# Patient Record
Sex: FEMALE
[De-identification: names, ages, dates, MRNs, and addresses within clinical notes are randomized per-mention and may not be internally consistent; named-entity substitution may affect disease eponyms.]

---

## 2018-01-06 ENCOUNTER — HOSPITAL ENCOUNTER (EMERGENCY)
Dept: HOSPITAL 88 - ER | Age: 63
Discharge: HOME | End: 2018-01-06
Payer: COMMERCIAL

## 2018-01-06 VITALS — DIASTOLIC BLOOD PRESSURE: 86 MMHG | SYSTOLIC BLOOD PRESSURE: 144 MMHG

## 2018-01-06 VITALS — WEIGHT: 146 LBS | BODY MASS INDEX: 27.56 KG/M2 | HEIGHT: 61 IN

## 2018-01-06 DIAGNOSIS — R11.2: ICD-10-CM

## 2018-01-06 DIAGNOSIS — E11.9: ICD-10-CM

## 2018-01-06 DIAGNOSIS — J20.9: ICD-10-CM

## 2018-01-06 DIAGNOSIS — R05: ICD-10-CM

## 2018-01-06 DIAGNOSIS — R50.9: Primary | ICD-10-CM

## 2018-01-06 DIAGNOSIS — I10: ICD-10-CM

## 2018-01-06 DIAGNOSIS — E78.5: ICD-10-CM

## 2018-01-06 PROCEDURE — 87400 INFLUENZA A/B EACH AG IA: CPT

## 2018-01-06 PROCEDURE — 99283 EMERGENCY DEPT VISIT LOW MDM: CPT

## 2018-01-06 PROCEDURE — 71020: CPT

## 2018-01-06 NOTE — DIAGNOSTIC IMAGING REPORT
EXAMINATION: Chest,  CHEST 2 VIEWS    



INDICATION: Chest pain



COMPARISON:  None

     

FINDINGS:    



LINES:  None.



Heart:  Normal cardiac silhouette.



Vascular: The pulmonary vasculature is within normal limits.  Atherosclerotic

calcifications of the aortic arch.



Mediastinum: No mediastinal, hilar, or axillary mass or lymphadenopathy.



Lungs: No parenchymal mass.  No focal consolidation.



Pleura:  No pleural effusion.  No pneumothorax.



Bones: No acute osseous abnormality.  Degenerative changes of the thoracic

spine.



Soft tissues:  Normal.



Impression: 

No acute radiographic abnormality.



Signed by: Dr. Gómez Magaña M.D. on 1/6/2018 12:12 PM

## 2019-10-22 ENCOUNTER — HOSPITAL ENCOUNTER (EMERGENCY)
Dept: HOSPITAL 88 - ER | Age: 64
Discharge: HOME | End: 2019-10-22
Payer: MEDICARE

## 2019-10-22 VITALS — WEIGHT: 146 LBS | HEIGHT: 61 IN | BODY MASS INDEX: 27.56 KG/M2

## 2019-10-22 DIAGNOSIS — E11.9: ICD-10-CM

## 2019-10-22 DIAGNOSIS — E78.00: ICD-10-CM

## 2019-10-22 DIAGNOSIS — I10: ICD-10-CM

## 2019-10-22 DIAGNOSIS — L24.9: Primary | ICD-10-CM

## 2019-10-22 PROCEDURE — 99282 EMERGENCY DEPT VISIT SF MDM: CPT

## 2019-10-22 NOTE — XMS REPORT
Summary of Care

                             Created on: 2019



CINDY MERA

External Reference #: [id number]

: 1955

Sex: Female



Demographics







                          Address                   [id number] [place] [address] [address] 

[place]  [address]

 

                          Preferred Language        English

 

                          Marital Status            Unknown

 

                          Jew Affiliation     Unknown

 

                          Race                      White

 

                          Ethnic Group              Non-





Author







                          Author                    XAVIER TRAMMELL,  JESSICA

 

                          Organization              Unknown

 

                          Address                   Unknown

 

                          Phone                     Unavailable







Care Team Providers







                    Care Team Member Name    Role                Phone

 

                    XAVIER TRAMMELL,  JESSICA    Unavailable         Unavailable

 

                    XAVIER KIMBROUGH,  JESSICA BOBY    Unavailable         Unavailable

 

                          Unavailable               Unavailable







Functional Status







                    Name                Dates               Details

 

                                        Functional status health issues are not documented

                                                    Status: 









                    Name                Dates               Details

 

                                        Cognitive status health issues are not documented

                                                    Status: 







Problems







                    Name                Dates               Details

 

                                        Left elbow pain (719.42, M25.522) 

                                                    Status: Active

 

                                        Sprain of carpal joint of left wrist, initial encounter (842.01, S63.512A) 

                                                    Status: Active

 

                                        Pain of both elbows (719.42, M25.521) 

                                                    Status: Active

 

                                        Left wrist pain (719.43, M25.532) 

                                                    Status: Active

 

                                        Closed nondisplaced fracture of head of left radius, initial encounter (813.05, 

S52.125A) 

                                                    Status: Active

 

                                        Closed nondisplaced fracture of head of left radius with routine healing, subsequent

 encounter (V54.12, S52.125D) 

                                                    Status: Active

 

                                        Sprain of carpal joint, left, subsequent encounter (V58.89, S63.512D) 

                                                    Status: Active







Medications







                    Name                Dates               Details

 

                                        amLODIPine Besylate 10 MG Oral Tablet



 









Active

Atorvastatin Calcium 40 MG Oral Tablet

* 





                                         Refills: 0







Active

Lisinopril-HCTZ 25-20 MG TABS

* 





                                         Refills: 0







Active

tiZANidine HCl - 4 MG Oral Tablet

* 





                                         Refills: 0







Active

Topiramate ER 25 MG Oral Capsule ER 24 Hour Sprinkle

* 





                                         Refills: 0







Active

Vitamin D3 2000 UNIT Oral Tablet

* 





                                         Refills: 0







Active

HumaLOG KwikPen 100 UNIT/ML Subcutaneous Solution Pen-injector

* 





                                         Refills: 0







Active

Basaglar KwikPen SOPN

* 





                                         Refills: 0







Active





Allergies and Adverse Reactions







                    Name                Dates               Details

 

                    Penicillins (Allergy)                        Status: Active









Past Medical History







                    Name                Dates               Details

 

                                        History of Diabetes (250.00, E11.9) 

                                                    Status: Resolved

 

                                        History of High blood pressure (401.9, I10) 

                                                    Status: Resolved







Procedures







                    Procedure           Dates               Details

 

                    History of Hysterectomy                        Completed 









Immunization







                    Name                Dates               Details

 

                                        Immunizations not documented

                                                     







Social History







                    Name                Dates               Details

 

                                        -

                                                    Status: 









                    Name                Dates               Details

 

                    Never smoker                             







Vital Signs







                Date            Test            Result          Details

 

                                                                 

 

                                        [id number]

                    Height              61 in               Status: 



 

                    Weight              137 lb              Status: 



 

                    Body Mass Index Calculated    25.89 kg/m2         Status: 



 

                    Body Surface Area Calculated    1.61 m2             Status: 



 

                                                                 

 

                                        23-May-201910:06

                    BP Systolic         142 mm[Hg]          Status: 



 

                    BP Diastolic        75 mm[Hg]           Status: 



 

                    Height              61 in               Status: 



 

                    Weight              130 lb              Status: 



 

                    Body Mass Index Calculated    24.56 kg/m2         Status: 



 

                    Body Surface Area Calculated    1.57 m2             Status: 









Results







                Date            Description     Value           Details

 

                    Results not documented                         

 

                                                                 







Plan of Care







                    Name                Dates               Details

 

                                        Planned Observations 

 

                    Planned Goals not documented                         







Interventions Provided

Plan* Follow Up:  

*  Please schedule an appointment as needed for any future problems or concerns.
   

* She has reached maximum medical recovery for this injury. Her ROM is better 
  and her pain is also greatly improved. She feels ready to RTW FD. HEP again 
  encouraged. If she does have any future troubles related to the injury, we can
   certainly see her with authorization. Work status: FD going forward and she 
  is pleased with the plan.





Instructions







                    Name                Dates               Details

 

                                        Instructions not documented

                                                     







Encounters







                                        Appointment; JESSICA PRAK M.D. 

Encounter Diagnosis: Problem not documented

                                        On: 2019 9:00



 

                                        Appointment; JESSICA PARK M.D. 

Encounter Diagnosis: Problem not documented

                                        On: 2019 11:30



 

                                        Appointment; JESSICA PARK M.D. 

Encounter Diagnosis: Problem not documented

                                        On: 23-May-2019 10:00



 

                                        Appointment; JESSICA PARK M.D. 

Encounter Diagnosis: Problem not documented

                                        On: 2019 9:30

## 2019-10-22 NOTE — XMS REPORT
Patient Summary Document

                             Created on: 10/22/2019



CINDY MERA

External Reference #: [id number]

: 1955

Sex: Female



Demographics







                          Address                   [address] 

[place]  [address]

 

                          Home Phone                [phone]

 

                          Preferred Language        Unknown

 

                          Marital Status            Unknown

 

                          Temple Affiliation     Unknown

 

                          Race                      Unknown

 

                                        Additional Race(s) 

 

 

                          Ethnic Group              Unknown





Author







                          Author                    [facility]

 

                          [facility]

 

                          Address                   Unknown

 

                          Phone                     Unavailable







Care Team Providers







                    Care Team Member Name    Role                Phone

 

                    DENNYS TED KELLEY    Unavailable         Unavailable

 

                    LARA CARMICHAEL    Unavailable         Unavailable







Problems

This patient has no known problems.



Allergies, Adverse Reactions, Alerts

This patient has no known allergies or adverse reactions.



Medications

This patient has no known medications.



Encounters







             Start Date/Time    End Date/Time    Encounter Type    Admission Type    Attending Clinicians

                    Care Facility       Care Department     Encounter ID

 

        2019 10:30:00    2019 10:30:00    Emergency    E               [facility]    7502







Results







           Test Description    Test Time    Test Comments    Text Results    Atomic Results    Result

 Comments

 

                POCT-GLUCOSE METER    2019 08:39:00                      

 

   

 

                POC-GLUCOSE METER (BEAKER) (test code=1538)    229 mg/dL                 TESTED AT [address] [address]





POCT-GLUCOSE [id number] 08:39:00* 





                Test Item       Value           Reference Range    Comments

 

                POC-GLUCOSE METER (BEAKER) (test code=1538)    315 mg/dL                 Notified RN MD/TESTED

 AT [address] [address]





BASIC METABOLIC [id number] 07:55:00* 





                Test Item       Value           Reference Range    Comments

 

                SODIUM (BEAKER) (test code=381)    138 meq/L       136-145          

 

                POTASSIUM (BEAKER) (test code=379)    4.6 meq/L       3.5-5.1          

 

                CHLORIDE (BEAKER) (test code=382)    109 meq/L                  

 

                CO2 (BEAKER) (test code=355)    23 meq/L        22-29            

 

                BLOOD UREA NITROGEN (BEAKER) (test code=354)    28 mg/dL        7-21             

 

                CREATININE (BEAKER) (test code=358)    1.39 mg/dL      0.57-1.25        

 

                GLUCOSE RANDOM (BEAKER) (test code=652)    260 mg/dL                  

 

                CALCIUM (BEAKER) (test code=697)    8.4 mg/dL       8.4-10.2         

 

                EGFR (BEAKER) (test code=1092)     mL/min/1.73 sq m                    INSUFFICIENT CLINICAL DATA

 TO CALCULATE ESTIMATED GFR.





POCT-GLUCOSE [id number] 13:02:00* 





                Test Item       Value           Reference Range    Comments

 

                POC-GLUCOSE METER (BEAKER) (test code=1538)    336 mg/dL                 TESTED AT [facility] 

[address] [facility] [address]





POCT-GLUCOSE [id number] 13:02:00* 





                Test Item       Value           Reference Range    Comments

 

                POC-GLUCOSE METER (BEAKER) (test code=1538)    245 mg/dL                 TESTED AT [place] [id number] [facility] [address]





POCT-GLUCOSE [id number] 13:02:00* 





                Test Item       Value           Reference Range    Comments

 

                POC-GLUCOSE METER (BEAKER) (test code=1538)    241 mg/dL                 TESTED AT [place] [id number] [facility] [address]





BASIC METABOLIC [id number] 06:26:00* 





                Test Item       Value           Reference Range    Comments

 

                SODIUM (BEAKER) (test code=381)    134 meq/L       136-145          

 

                POTASSIUM (BEAKER) (test code=379)    4.4 meq/L       3.5-5.1          

 

                CHLORIDE (BEAKER) (test code=382)    106 meq/L                  

 

                CO2 (BEAKER) (test code=355)    23 meq/L        22-29            

 

                BLOOD UREA NITROGEN (BEAKER) (test code=354)    48 mg/dL        7-21             

 

                CREATININE (BEAKER) (test code=358)    1.83 mg/dL      0.57-1.25        

 

                GLUCOSE RANDOM (BEAKER) (test code=652)    269 mg/dL                  

 

                CALCIUM (BEAKER) (test code=697)    8.4 mg/dL       8.4-10.2         

 

                EGFR (BEAKER) (test code=1092)     mL/min/1.73 sq m                    INSUFFICIENT CLINICAL DATA

 TO CALCULATE ESTIMATED GFR.





CBC W/PLT COUNT & AUTO [id number] 05:47:00* 





                Test Item       Value           Reference Range    Comments

 

                WHITE BLOOD CELL COUNT (BEAKER) (test code=775)    10.0 K/ L       3.5-10.5         

 

                RED BLOOD CELL COUNT (BEAKER) (test code=761)    3.37 M/ L       3.93-5.22        

 

                HEMOGLOBIN (BEAKER) (test code=410)    9.9 GM/DL       11.2-15.7        

 

                HEMATOCRIT (BEAKER) (test code=411)    30.4 %          34.1-44.9        

 

                MEAN CORPUSCULAR VOLUME (BEAKER) (test code=753)    90.2 fL         79.4-94.8        

 

                MEAN CORPUSCULAR HEMOGLOBIN (BEAKER) (test code=751)    29.4 pg         25.6-32.2        

 

                    MEAN CORPUSCULAR HEMOGLOBIN CONC (BEAKER) (test code=752)    32.6 GM/DL          32.2-35.5

                                         

 

                RED CELL DISTRIBUTION WIDTH (BEAKER) (test code=412)    12.2 %          11.7-14.4        

 

                PLATELET COUNT (BEAKER) (test code=756)    215 K/CU MM     150-450          

 

                MEAN PLATELET VOLUME (BEAKER) (test code=754)    9.9 fL          9.4-12.3         

 

                NUCLEATED RED BLOOD CELLS (BEAKER) (test code=413)    0 /100 WBC      0-0              

 

                NEUTROPHILS RELATIVE PERCENT (BEAKER) (test code=429)    78 %                             

 

                LYMPHOCYTES RELATIVE PERCENT (BEAKER) (test code=430)    13 %                             

 

                MONOCYTES RELATIVE PERCENT (BEAKER) (test code=431)    8 %                              

 

                EOSINOPHILS RELATIVE PERCENT (BEAKER) (test code=432)    1 %                              

 

                BASOPHILS RELATIVE PERCENT (BEAKER) (test code=437)    0 %                              

 

                NEUTROPHILS ABSOLUTE COUNT (BEAKER) (test code=670)    7.80 K/ L       1.56-6.13        

 

                LYMPHOCYTES ABSOLUTE COUNT (BEAKER) (test code=414)    1.25 K/ L       1.18-3.74        

 

                MONOCYTES ABSOLUTE COUNT (BEAKER) (test code=415)    0.77 K/ L       0.24-0.36        

 

                EOSINOPHILS ABSOLUTE COUNT (BEAKER) (test code=416)    0.11 K/ L       0.04-0.36        

 

                BASOPHILS ABSOLUTE COUNT (BEAKER) (test code=417)    0.02 K/ L       0.01-0.08        

 

                IMMATURE GRANULOCYTES-RELATIVE PERCENT (BEAKER) (test code=2801)    1 %             0-1              





POCT-GLUCOSE [id number] 17:32:00* 





                Test Item       Value           Reference Range    Comments

 

                POC-GLUCOSE METER (BEAKER) (test code=1538)    249 mg/dL                 TESTED AT [facility] 

[address] [facility] [address]





POCT-GLUCOSE [id number] 12:36:00* 





                Test Item       Value           Reference Range    Comments

 

                POC-GLUCOSE METER (BEAKER) (test code=1538)    279 mg/dL                 TESTED AT [facility] 

[address] [facility] [address]





POCT-GLUCOSE [id number] 08:34:00* 





                Test Item       Value           Reference Range    Comments

 

                POC-GLUCOSE METER (BEAKER) (test code=1538)    124 mg/dL                 TESTED AT [place] [id number] [facility] [address]





BASIC METABOLIC [id number] 05:52:00* 





                Test Item       Value           Reference Range    Comments

 

                SODIUM (BEAKER) (test code=381)    133 meq/L       136-145          

 

                POTASSIUM (BEAKER) (test code=379)    3.7 meq/L       3.5-5.1          

 

                CHLORIDE (BEAKER) (test code=382)    103 meq/L                  

 

                CO2 (BEAKER) (test code=355)    22 meq/L        22-29            

 

                BLOOD UREA NITROGEN (BEAKER) (test code=354)    84 mg/dL        7-21             

 

                CREATININE (BEAKER) (test code=358)    3.51 mg/dL      0.57-1.25        

 

                GLUCOSE RANDOM (BEAKER) (test code=652)    99 mg/dL                   

 

                CALCIUM (BEAKER) (test code=697)    8.2 mg/dL       8.4-10.2         

 

                EGFR (BEAKER) (test code=1092)     mL/min/1.73 sq m                    INSUFFICIENT CLINICAL DATA

 TO CALCULATE ESTIMATED GFR.





CBC W/PLT COUNT & AUTO [id number] 05:05:00* 





                Test Item       Value           Reference Range    Comments

 

                WHITE BLOOD CELL COUNT (BEAKER) (test code=775)    7.7 K/ L        3.5-10.5         

 

                RED BLOOD CELL COUNT (BEAKER) (test code=761)    3.20 M/ L       3.93-5.22        

 

                HEMOGLOBIN (BEAKER) (test code=410)    9.7 GM/DL       11.2-15.7        

 

                HEMATOCRIT (BEAKER) (test code=411)    28.5 %          34.1-44.9        

 

                MEAN CORPUSCULAR VOLUME (BEAKER) (test code=753)    89.1 fL         79.4-94.8        

 

                MEAN CORPUSCULAR HEMOGLOBIN (BEAKER) (test code=751)    30.3 pg         25.6-32.2        

 

                    MEAN CORPUSCULAR HEMOGLOBIN CONC (BEAKER) (test code=752)    34.0 GM/DL          32.2-35.5

                                         

 

                RED CELL DISTRIBUTION WIDTH (BEAKER) (test code=412)    12.0 %          11.7-14.4        

 

                PLATELET COUNT (BEAKER) (test code=756)    204 K/CU MM     150-450          

 

                MEAN PLATELET VOLUME (BEAKER) (test code=754)    10.1 fL         9.4-12.3         

 

                NUCLEATED RED BLOOD CELLS (BEAKER) (test code=413)    0 /100 WBC      0-0              

 

                NEUTROPHILS RELATIVE PERCENT (BEAKER) (test code=429)    70 %                             

 

                LYMPHOCYTES RELATIVE PERCENT (BEAKER) (test code=430)    20 %                             

 

                MONOCYTES RELATIVE PERCENT (BEAKER) (test code=431)    8 %                              

 

                EOSINOPHILS RELATIVE PERCENT (BEAKER) (test code=432)    1 %                              

 

                BASOPHILS RELATIVE PERCENT (BEAKER) (test code=437)    0 %                              

 

                NEUTROPHILS ABSOLUTE COUNT (BEAKER) (test code=670)    5.38 K/ L       1.56-6.13        

 

                LYMPHOCYTES ABSOLUTE COUNT (BEAKER) (test code=414)    1.57 K/ L       1.18-3.74        

 

                MONOCYTES ABSOLUTE COUNT (BEAKER) (test code=415)    0.61 K/ L       0.24-0.36        

 

                EOSINOPHILS ABSOLUTE COUNT (BEAKER) (test code=416)    0.11 K/ L       0.04-0.36        

 

                BASOPHILS ABSOLUTE COUNT (BEAKER) (test code=417)    0.01 K/ L       0.01-0.08        

 

                IMMATURE GRANULOCYTES-RELATIVE PERCENT (BEAKER) (test code=2801)    0 %             0-1              





U/S, RENAL, [id number] 05:02:00Reason for exam:->akiFINAL REPORT PATIENT
ID:   [id number] Ultrasound of the Kidneys Clinical History:  odessa Discussion: 
Sonographic evaluation of the kidneys was performed. No prior study for direct 
comparison. Right kidney:  9.4 x 4.1 x 4.3 cm, with cortical thickness of 1.1 
cm.  Normal cortical echogenicity.  No mass.  No shadowing calculus. No 
hydronephrosis. Left kidney: 7.7 x 4.5 x 4.3 cm, with cortical thickness of 1.3 
cm.  Normal cortical echogenicity.  No mass.  No shadowing calculus.  No h
ydronephrosis. Limited doppler evaluation of bilateral main renal arteries and v
eins demonstrate patency.  Bladder:  Distended with a prevoid volume of 306 cc. 
Small postvoid residual of 44 cc. Impression:No hydronephrosis.Small urinary gabrielle
dder postvoid residual. Signed: Danna Corley MDReport Verified Date/Time:   05:02:14 Reading Location: [facility] B1 C013Y CT Body Reading Room    [facility] signed by: DANNA CORLEY MD on 2019 05:02 AM POCT-GLUCOSE 
[id number] 22:50:00* 





                Test Item       Value           Reference Range    Comments

 

                POC-GLUCOSE METER (BEAKER) (test code=1538)    95 mg/dL                  TESTED AT [facility] [address]

 [facility] [address]





POCT-GLUCOSE [id number] 22:12:00* 





                Test Item       Value           Reference Range    Comments

 

                POC-GLUCOSE METER (BEAKER) (test code=1538)    68 mg/dL                  TESTED AT [place] [id number]

 [facility] [address]





URINALYSIS W/ REFLEX URINE [id number] 21:48:00* 





                Test Item       Value           Reference Range    Comments

 

                COLOR (BEAKER) (test code=470)    Yellow                           

 

                CLARITY (BEAKER) (test code=469)    Hazy                             

 

                SPECIFIC GRAVITY UA (BEAKER) (test code=468)    1.012           1.001-1.035      

 

                PH UA (BEAKER) (test code=467)    5.0             5.0-8.0          

 

                PROTEIN UA (BEAKER) (test code=464)    20 mg/dL        Negative         

 

                GLUCOSE UA (BEAKER) (test code=365)    Negative        Negative         

 

                KETONES UA (BEAKER) (test code=371)    Trace           Negative         

 

                BILIRUBIN UA (BEAKER) (test code=462)    Negative        Negative         

 

                BLOOD UA (BEAKER) (test code=461)    Negative        Negative         

 

                NITRITE UA (BEAKER) (test code=465)    Negative        Negative         

 

                LEUKOCYTE ESTERASE UA (BEAKER) (test code=466)    Large           Negative         

 

                UROBILINOGEN UA (BEAKER) (test code=463)    0.2 mg/dL       0.2-1.0          

 

                RBC UA (BEAKER) (test code=519)    2 /HPF                           

 

                WBC UA (BEAKER) (test code=520)    18 /HPF                          

 

                BACTERIA (BEAKER) (test hhsd=031)    Many                             

 

                MUCUS (BEAKER) (test code=1574)    Rare                             

 

                SQUAMOUS EPITHELIAL (BEAKER) (test code=516)    1 /HPF                           

 

                HYALINE CASTS (BEAKER) (test code=514)    2 /LPF                           

 

                YEAST (BEAKER) (test code=1585)    Moderate                         

 

                SOURCE(BEAKER) (test code=2795)                                     





[id number] 18:13:00* 





                Test Item       Value           Reference Range    Comments

 

                POTASSIUM (BEAKER) (test code=379)    4.5 meq/L       3.5-5.1          





RAD, CHEST, 1 VIEW, NON [id number] 18:06:00Reason for exam:->coughShould 
this be performed at the bedside?->YesFINAL REPORT PATIENT ID:   [id number] 
Portable chest 2019, 1554 hours COMPARISON: None Lungs are clear and 
expanded. Heart is upper limits of normal in size. No mediastinal abnormalities 
are seen. No significant osseous abnormalities are noted although there are some
degenerative changes with hypertrophic changes extending inferiorly from the 
distal portion of the left clavicle. This, in part, may be related to old 
trauma. Signed: Chasidy Zhang Verified Date/Time:  2019 18:06:59 
Reading Location: [address] Consult Reading Room      Electronically signed 
by: CHASIDY ZHANG M.D. on 2019 06:06 PM POCT-GLUCOSE [id number] 
17:02:00* 





                Test Item       Value           Reference Range    Comments

 

                POC-GLUCOSE METER (BEAKER) (test code=1538)    270 mg/dL                 TESTED AT [facility] 

[address] [place] [address]





COMPREHENSIVE METABOLIC [id number] 15:46:00* 





                Test Item       Value           Reference Range    Comments

 

                TOTAL PROTEIN (BEAKER) (test code=770)    7.3 gm/dL       6.0-8.3         Specimen moderately 

hemolyzed

 

                ALBUMIN (BEAKER) (test code=1145)    3.8 g/dL        3.5-5.0         Specimen moderately hemolyzed



 

                ALKALINE PHOSPHATASE (BEAKER) (test code=346)    60 U/L                     

 

                BILIRUBIN TOTAL (BEAKER) (test code=377)    0.7 mg/dL       0.2-1.2         Specimen moderately

 hemolyzed

 

                SODIUM (BEAKER) (test code=381)    130 meq/L       136-145          

 

                POTASSIUM (BEAKER) (test code=379)    5.4 meq/L       3.5-5.1         Specimen moderately hemolyzed



 

                CHLORIDE (BEAKER) (test code=382)    93 meq/L                   

 

                CO2 (BEAKER) (test code=355)    24 meq/L        22-29            

 

                BLOOD UREA NITROGEN (BEAKER) (test code=354)    88 mg/dL        7-21             

 

                CREATININE (BEAKER) (test code=358)    4.85 mg/dL      0.57-1.25       Specimen moderately 

hemolyzed

 

                GLUCOSE RANDOM (BEAKER) (test code=652)    232 mg/dL                  

 

                CALCIUM (BEAKER) (test code=697)    9.2 mg/dL       8.4-10.2         

 

                AST (SGOT) (BEAKER) (test code=353)    17 U/L          5-34            Specimen moderately hemolyzed



 

                ALT (SGPT) (BEAKER) (test code=347)    12 U/L          6-55            Specimen moderately  hemolyzed



 

                EGFR (BEAKER) (test code=1092)     mL/min/1.73 sq m                    INSUFFICIENT CLINICAL DATA

 TO CALCULATE ESTIMATED GFR.





PROTHROMBIN [id number] 15:34:00* 





                Test Item       Value           Reference Range    Comments

 

                PROTIME (BEAKER) (test code=759)    14.1 seconds    11.7-14.7        

 

                INR (BEAKER) (test code=370)    1.1             <=5.9            





RECOMMENDED COUMADIN/WARFARIN INR THERAPY RANGESSTANDARD DOSE: 2.0 - 3.0   Inclu
janet: PROPHYLAXIS for venous thrombosis, systemic embolization; TREATMENT for kranthi
ous thrombosis and/or pulmonary embolus.HIGH RISK: Target INR is 2.5-3.5 for pat
ients with mechanical heart valves.CBC W/PLT COUNT & AUTO [id number]
15:17:00* 





                Test Item       Value           Reference Range    Comments

 

                WHITE BLOOD CELL COUNT (BEAKER) (test code=775)    7.0 K/ L        3.5-10.5         

 

                RED BLOOD CELL COUNT (BEAKER) (test code=761)    3.66 M/ L       3.93-5.22        

 

                HEMOGLOBIN (BEAKER) (test code=410)    10.8 GM/DL      11.2-15.7        

 

                HEMATOCRIT (BEAKER) (test code=411)    32.4 %          34.1-44.9        

 

                MEAN CORPUSCULAR VOLUME (BEAKER) (test code=753)    88.5 fL         79.4-94.8        

 

                MEAN CORPUSCULAR HEMOGLOBIN (BEAKER) (test code=751)    29.5 pg         25.6-32.2        

 

                    MEAN CORPUSCULAR HEMOGLOBIN CONC (BEAKER) (test code=752)    33.3 GM/DL          32.2-35.5

                                         

 

                RED CELL DISTRIBUTION WIDTH (BEAKER) (test code=412)    12.0 %          11.7-14.4        

 

                PLATELET COUNT (BEAKER) (test code=756)    257 K/CU MM     150-450          

 

                MEAN PLATELET VOLUME (BEAKER) (test code=754)    10.2 fL         9.4-12.3         

 

                NUCLEATED RED BLOOD CELLS (BEAKER) (test code=413)    0 /100 WBC      0-0              

 

                NEUTROPHILS RELATIVE PERCENT (BEAKER) (test code=429)    78 %                             

 

                LYMPHOCYTES RELATIVE PERCENT (BEAKER) (test code=430)    14 %                             

 

                MONOCYTES RELATIVE PERCENT (BEAKER) (test code=431)    6 %                              

 

                EOSINOPHILS RELATIVE PERCENT (BEAKER) (test code=432)    0 %                              

 

                BASOPHILS RELATIVE PERCENT (BEAKER) (test code=437)    0 %                              

 

                NEUTROPHILS ABSOLUTE COUNT (BEAKER) (test code=670)    5.51 K/ L       1.56-6.13        

 

                LYMPHOCYTES ABSOLUTE COUNT (BEAKER) (test code=414)    0.99 K/ L       1.18-3.74        

 

                MONOCYTES ABSOLUTE COUNT (BEAKER) (test code=415)    0.45 K/ L       0.24-0.36        

 

                EOSINOPHILS ABSOLUTE COUNT (BEAKER) (test code=416)    0.03 K/ L       0.04-0.36        

 

                BASOPHILS ABSOLUTE COUNT (BEAKER) (test code=417)    0.01 K/ L       0.01-0.08        

 

                IMMATURE GRANULOCYTES-RELATIVE PERCENT (BEAKER) (test code=2801)    1 %             0-1              





CHEST 2 VIEWS    [facility]   [address] [id number]      Patient Name: CINDY MERA   MR #:
[id number]    : 1955 Age/Sex: 62/F  Acct #: [id number] Req #: [id number]
[id number]  Adm Physician:     Ordered by: DARLING MARTIN  Report #: [id number]
[id number]   Location: ER  Room/Bed:     
___________________________________________________________________
________________________________    Procedure: [id number] DX/CHEST 2 VIEWS  Exam 
Date: 18                            Exam Time: 1150       REPORT STATUS: S
igned    EXAMINATION: Chest,  CHEST 2 VIEWS          INDICATION: Chest pain     
COMPARISON:  None           FINDINGS:          LINES:  None.      Heart:  Normal
cardiac silhouette.      Vascular: The pulmonary vasculature is within normal 
limits.  Atherosclerotic   calcifications of the aortic arch.      Mediastinum: 
No mediastinal, hilar, or axillary mass or lymphadenopathy.      Lungs: No paren
chymal mass.  No focal consolidation.      Pleura:  No pleural effusion.  No pne
umothorax.      Bones: No acute osseous abnormality.  Degenerative changes of th
e thoracic   spine.      Soft tissues:  Normal.      Impression:    No acute rad
iographic abnormality.      Signed by: Dr. Farhana Reyes M.D. on 2018 12:12 P
M        Dictated By: FARHANA REYES MD  Electronically Signed By: FARHANA shah 18 1212  Transcribed By: RADHA on 18 1212       COPY TO:   DARLING MOODY